# Patient Record
Sex: FEMALE | Race: OTHER | Employment: OTHER | URBAN - METROPOLITAN AREA
[De-identification: names, ages, dates, MRNs, and addresses within clinical notes are randomized per-mention and may not be internally consistent; named-entity substitution may affect disease eponyms.]

---

## 2017-05-02 ENCOUNTER — HOSPITAL ENCOUNTER (OUTPATIENT)
Dept: MRI IMAGING | Age: 62
Discharge: HOME | End: 2017-05-02
Payer: MEDICARE

## 2017-05-02 DIAGNOSIS — M96.1 POST LAMINECTOMY SYNDROME: ICD-10-CM

## 2017-05-02 PROCEDURE — A9579 GAD-BASE MR CONTRAST NOS,1ML: HCPCS | Performed by: RADIOLOGY

## 2017-05-02 PROCEDURE — 6360000004 HC RX CONTRAST MEDICATION: Performed by: RADIOLOGY

## 2017-05-02 PROCEDURE — 72158 MRI LUMBAR SPINE W/O & W/DYE: CPT

## 2017-05-02 RX ADMIN — GADOVERSETAMIDE 15 ML: 0.5 INJECTION, SOLUTION INTRAVENOUS at 11:53

## 2018-05-24 ENCOUNTER — HOSPITAL ENCOUNTER (OUTPATIENT)
Dept: WOMENS IMAGING | Age: 63
Discharge: HOME | End: 2018-05-26
Payer: MEDICARE

## 2018-05-24 ENCOUNTER — HOSPITAL ENCOUNTER (OUTPATIENT)
Dept: LAB | Age: 63
Discharge: HOME | End: 2018-05-24
Payer: MEDICARE

## 2018-05-24 ENCOUNTER — HOSPITAL ENCOUNTER (OUTPATIENT)
Dept: ULTRASOUND IMAGING | Age: 63
Discharge: HOME | End: 2018-05-26
Payer: MEDICARE

## 2018-05-24 DIAGNOSIS — R92.8 ABNORMAL MAMMOGRAM: ICD-10-CM

## 2018-05-24 DIAGNOSIS — N64.4 PAIN OF LEFT BREAST: ICD-10-CM

## 2018-05-24 DIAGNOSIS — R52 PAIN: ICD-10-CM

## 2018-05-24 LAB
ALT SERPL-CCNC: 26 U/L (ref 0–33)
ANION GAP SERPL CALCULATED.3IONS-SCNC: 18 MEQ/L (ref 7–13)
AST SERPL-CCNC: 20 U/L (ref 0–35)
BACTERIA: NORMAL /HPF
BILIRUBIN URINE: NEGATIVE
BLOOD, URINE: NEGATIVE
BUN BLDV-MCNC: 8 MG/DL (ref 8–23)
CALCIUM SERPL-MCNC: 9.5 MG/DL (ref 8.6–10.2)
CHLORIDE BLD-SCNC: 95 MEQ/L (ref 98–107)
CHOLESTEROL, TOTAL: 295 MG/DL (ref 0–199)
CLARITY: CLEAR
CO2: 25 MEQ/L (ref 22–29)
COLOR: YELLOW
CREAT SERPL-MCNC: 0.6 MG/DL (ref 0.5–0.9)
CREATININE URINE: 90.8 MG/DL
EPITHELIAL CELLS, UA: NORMAL /HPF
GFR AFRICAN AMERICAN: >60
GFR NON-AFRICAN AMERICAN: >60
GLUCOSE BLD-MCNC: 98 MG/DL (ref 74–109)
GLUCOSE URINE: NEGATIVE MG/DL
HDLC SERPL-MCNC: 26 MG/DL (ref 40–59)
KETONES, URINE: NEGATIVE MG/DL
LDL CHOLESTEROL CALCULATED: ABNORMAL MG/DL (ref 0–129)
LDL CHOLESTEROL DIRECT: 157 MG/DL (ref 0–129)
LEUKOCYTE ESTERASE, URINE: NEGATIVE
MICROALBUMIN UR-MCNC: <1.2 MG/DL
MICROALBUMIN/CREAT UR-RTO: NORMAL MG/G (ref 0–30)
NITRITE, URINE: NEGATIVE
PH UA: 5.5 (ref 5–9)
POTASSIUM SERPL-SCNC: 4 MEQ/L (ref 3.5–5.1)
PROTEIN UA: NEGATIVE MG/DL
RBC UA: NORMAL /HPF (ref 0–2)
SODIUM BLD-SCNC: 138 MEQ/L (ref 132–144)
SPECIFIC GRAVITY UA: 1.01 (ref 1–1.03)
T4 FREE: 1.94 NG/DL (ref 0.93–1.7)
TRIGL SERPL-MCNC: 587 MG/DL (ref 0–200)
TSH SERPL DL<=0.05 MIU/L-ACNC: <0.01 UIU/ML (ref 0.27–4.2)
UROBILINOGEN, URINE: 0.2 E.U./DL
VITAMIN D 25-HYDROXY: 13.7 NG/ML (ref 30–100)
WBC UA: NORMAL /HPF (ref 0–5)

## 2018-05-24 PROCEDURE — 82043 UR ALBUMIN QUANTITATIVE: CPT

## 2018-05-24 PROCEDURE — 76642 ULTRASOUND BREAST LIMITED: CPT

## 2018-05-24 PROCEDURE — 82306 VITAMIN D 25 HYDROXY: CPT

## 2018-05-24 PROCEDURE — 84460 ALANINE AMINO (ALT) (SGPT): CPT

## 2018-05-24 PROCEDURE — 82570 ASSAY OF URINE CREATININE: CPT

## 2018-05-24 PROCEDURE — 81001 URINALYSIS AUTO W/SCOPE: CPT

## 2018-05-24 PROCEDURE — 80048 BASIC METABOLIC PNL TOTAL CA: CPT

## 2018-05-24 PROCEDURE — 84439 ASSAY OF FREE THYROXINE: CPT

## 2018-05-24 PROCEDURE — 84450 TRANSFERASE (AST) (SGOT): CPT

## 2018-05-24 PROCEDURE — 84443 ASSAY THYROID STIM HORMONE: CPT

## 2018-05-24 PROCEDURE — 83721 ASSAY OF BLOOD LIPOPROTEIN: CPT

## 2018-05-24 PROCEDURE — 77066 DX MAMMO INCL CAD BI: CPT

## 2018-05-24 PROCEDURE — 80061 LIPID PANEL: CPT

## 2018-06-07 ENCOUNTER — INITIAL CONSULT (OUTPATIENT)
Dept: SURGERY | Age: 63
End: 2018-06-07
Payer: MEDICARE

## 2018-06-07 VITALS
HEIGHT: 61 IN | HEART RATE: 74 BPM | WEIGHT: 164.4 LBS | OXYGEN SATURATION: 96 % | BODY MASS INDEX: 31.04 KG/M2 | TEMPERATURE: 97.9 F | SYSTOLIC BLOOD PRESSURE: 118 MMHG | DIASTOLIC BLOOD PRESSURE: 68 MMHG

## 2018-06-07 DIAGNOSIS — R92.8 ABNORMAL MAMMOGRAM: Primary | ICD-10-CM

## 2018-06-07 PROCEDURE — G8427 DOCREV CUR MEDS BY ELIG CLIN: HCPCS | Performed by: SURGERY

## 2018-06-07 PROCEDURE — 4004F PT TOBACCO SCREEN RCVD TLK: CPT | Performed by: SURGERY

## 2018-06-07 PROCEDURE — G8417 CALC BMI ABV UP PARAM F/U: HCPCS | Performed by: SURGERY

## 2018-06-07 PROCEDURE — 3017F COLORECTAL CA SCREEN DOC REV: CPT | Performed by: SURGERY

## 2018-06-07 PROCEDURE — 99204 OFFICE O/P NEW MOD 45 MIN: CPT | Performed by: SURGERY

## 2018-06-07 ASSESSMENT — ENCOUNTER SYMPTOMS
VOMITING: 0
COLOR CHANGE: 0
TROUBLE SWALLOWING: 0
EYE PAIN: 0
BACK PAIN: 0
ABDOMINAL PAIN: 0
CHEST TIGHTNESS: 0
STRIDOR: 0
SHORTNESS OF BREATH: 0
EYE DISCHARGE: 0
ANAL BLEEDING: 0
CONSTIPATION: 0

## 2018-06-15 LAB
ANION GAP SERPL CALCULATED.3IONS-SCNC: 12 MMOL/L (ref 10–20)
BICARBONATE: 29 MMOL/L (ref 21–32)
BUN / CREAT RATIO: 12 (ref 5–25)
CALCIUM SERPL-MCNC: 9.4 MG/DL (ref 8.6–10.3)
CHLORIDE BLD-SCNC: 102 MMOL/L (ref 98–107)
CREAT SERPL-MCNC: 0.75 MG/DL (ref 0.5–1.05)
ERYTHROCYTE [DISTWIDTH] IN BLOOD BY AUTOMATED COUNT: 12.7 % (ref 12–15.4)
ERYTHROCYTE [DISTWIDTH] IN BLOOD BY AUTOMATED COUNT: 41.4 FL (ref 39.3–48.6)
GFR CALCULATED: >60
GLUCOSE: 114 MG/DL (ref 70–100)
HCT VFR BLD CALC: 43.7 % (ref 36.5–46.6)
HEMOGLOBIN: 14.7 G/DL (ref 11.8–15.3)
INR BLD: 0.98 (ref 0.9–1.1)
MCH RBC QN AUTO: 29.9 PG (ref 27.5–33)
MCHC RBC AUTO-ENTMCNC: 33.6 G/DL (ref 30.1–35)
MCV RBC AUTO: 89 FL (ref 85.4–100)
NUCLEATED RBCS: 0 /100{WBCS}
PARTIAL THROMBOPLASTIN TIME: 31.1 SEC (ref 25–36)
PLATELET # BLD: 251 10*3/UL (ref 155–404)
PMV BLD AUTO: 9.9 FL (ref 9.9–12.1)
POTASSIUM SERPL-SCNC: 3.6 MMOL/L (ref 3.5–5.1)
PROTHROMBIN TIME: 10.9 SEC (ref 9.8–12.7)
RBC: 4.91 10*6/UL (ref 3.85–5.1)
RBCS COUNTED: 0 10*3/UL
SODIUM BLD-SCNC: 139 MMOL/L (ref 136–145)
UREA NITROGEN: 9 MG/DL (ref 6–23)
WBC: 5.5 10*3/UL (ref 4.4–9.9)

## 2018-06-21 LAB — PATHOLOGY REPORT: NORMAL

## 2018-07-03 ENCOUNTER — OFFICE VISIT (OUTPATIENT)
Dept: SURGERY | Age: 63
End: 2018-07-03

## 2018-07-03 VITALS
HEART RATE: 81 BPM | TEMPERATURE: 98 F | HEIGHT: 61 IN | SYSTOLIC BLOOD PRESSURE: 136 MMHG | DIASTOLIC BLOOD PRESSURE: 88 MMHG | OXYGEN SATURATION: 96 % | BODY MASS INDEX: 31.36 KG/M2 | WEIGHT: 166.1 LBS

## 2018-07-03 DIAGNOSIS — R92.8 ABNORMAL MAMMOGRAM: Primary | ICD-10-CM

## 2018-07-03 PROCEDURE — 99024 POSTOP FOLLOW-UP VISIT: CPT | Performed by: SURGERY

## 2018-07-03 NOTE — PROGRESS NOTES
S/p needle localization biopsy    PATH  Benign    PE/  Wound good    A/ benign biopsy    P/ new baseline MMG in 6 months

## 2019-10-04 ENCOUNTER — OFFICE VISIT (OUTPATIENT)
Dept: ENDOCRINOLOGY | Age: 64
End: 2019-10-04
Payer: MEDICARE

## 2019-10-04 VITALS
WEIGHT: 173 LBS | BODY MASS INDEX: 32.66 KG/M2 | SYSTOLIC BLOOD PRESSURE: 105 MMHG | HEIGHT: 61 IN | DIASTOLIC BLOOD PRESSURE: 69 MMHG | HEART RATE: 69 BPM

## 2019-10-04 DIAGNOSIS — R73.9 HYPERGLYCEMIA: ICD-10-CM

## 2019-10-04 DIAGNOSIS — R53.83 FATIGUE, UNSPECIFIED TYPE: Primary | ICD-10-CM

## 2019-10-04 DIAGNOSIS — E27.1 ADDISON'S DISEASE (HCC): ICD-10-CM

## 2019-10-04 DIAGNOSIS — E06.3 HYPOTHYROIDISM DUE TO HASHIMOTO'S THYROIDITIS: ICD-10-CM

## 2019-10-04 DIAGNOSIS — R53.83 FATIGUE, UNSPECIFIED TYPE: ICD-10-CM

## 2019-10-04 DIAGNOSIS — E03.8 HYPOTHYROIDISM DUE TO HASHIMOTO'S THYROIDITIS: ICD-10-CM

## 2019-10-04 LAB
CORTISOL TOTAL: 1.7 UG/DL
FOLATE: 7 NG/ML (ref 7.3–26.1)
HBA1C MFR BLD: 6.2 % (ref 4.8–5.9)
T4 FREE: 1.6 NG/DL (ref 0.84–1.68)
TSH SERPL DL<=0.05 MIU/L-ACNC: 0.82 UIU/ML (ref 0.44–3.86)
VITAMIN B-12: 308 PG/ML (ref 232–1245)

## 2019-10-04 PROCEDURE — G8427 DOCREV CUR MEDS BY ELIG CLIN: HCPCS | Performed by: INTERNAL MEDICINE

## 2019-10-04 PROCEDURE — 4004F PT TOBACCO SCREEN RCVD TLK: CPT | Performed by: INTERNAL MEDICINE

## 2019-10-04 PROCEDURE — 3017F COLORECTAL CA SCREEN DOC REV: CPT | Performed by: INTERNAL MEDICINE

## 2019-10-04 PROCEDURE — 99203 OFFICE O/P NEW LOW 30 MIN: CPT | Performed by: INTERNAL MEDICINE

## 2019-10-04 PROCEDURE — G8484 FLU IMMUNIZE NO ADMIN: HCPCS | Performed by: INTERNAL MEDICINE

## 2019-10-04 PROCEDURE — G8417 CALC BMI ABV UP PARAM F/U: HCPCS | Performed by: INTERNAL MEDICINE

## 2019-10-04 RX ORDER — LEVOTHYROXINE SODIUM 175 UG/1
175 TABLET ORAL DAILY
Qty: 30 TABLET | Refills: 3
Start: 2019-10-04

## 2019-10-06 PROBLEM — M79.7 FIBROMYALGIA: Status: ACTIVE | Noted: 2019-10-06

## 2019-10-06 PROBLEM — E27.1 ADDISON'S DISEASE (HCC): Status: ACTIVE | Noted: 2019-10-06

## 2019-10-06 PROBLEM — F41.9 ANXIETY: Status: ACTIVE | Noted: 2019-10-06

## 2019-10-06 PROBLEM — E03.9 HYPOTHYROIDISM: Status: ACTIVE | Noted: 2019-10-06

## 2019-10-06 RX ORDER — COSYNTROPIN 0.25 MG/ML
250 INJECTION, POWDER, FOR SOLUTION INTRAMUSCULAR; INTRAVENOUS ONCE
Status: CANCELLED | OUTPATIENT
Start: 2019-10-06

## 2019-10-06 ASSESSMENT — ENCOUNTER SYMPTOMS: SWOLLEN GLANDS: 0

## 2019-10-11 ENCOUNTER — TELEPHONE (OUTPATIENT)
Dept: ENDOCRINOLOGY | Age: 64
End: 2019-10-11

## 2019-10-21 ENCOUNTER — HOSPITAL ENCOUNTER (OUTPATIENT)
Dept: INFUSION THERAPY | Age: 64
Setting detail: INFUSION SERIES
Discharge: HOME | End: 2019-10-21
Payer: MEDICARE

## 2019-10-21 VITALS — HEART RATE: 80 BPM | TEMPERATURE: 98.5 F | SYSTOLIC BLOOD PRESSURE: 120 MMHG | DIASTOLIC BLOOD PRESSURE: 57 MMHG

## 2019-10-21 DIAGNOSIS — E27.1 ADDISON'S DISEASE (HCC): Primary | ICD-10-CM

## 2019-10-21 LAB
CORTISOL 30 MIN: 10.7 UG/DL
CORTISOL 60 MIN: 12.5 UG/DL
CORTISOL BASE: 2.1 UG/DL

## 2019-10-21 PROCEDURE — 6360000002 HC RX W HCPCS: Performed by: INTERNAL MEDICINE

## 2019-10-21 PROCEDURE — 36415 COLL VENOUS BLD VENIPUNCTURE: CPT

## 2019-10-21 PROCEDURE — 96374 THER/PROPH/DIAG INJ IV PUSH: CPT

## 2019-10-21 PROCEDURE — 80400 ACTH STIMULATION PANEL: CPT

## 2019-10-21 RX ORDER — COSYNTROPIN 0.25 MG/ML
250 INJECTION, POWDER, FOR SOLUTION INTRAMUSCULAR; INTRAVENOUS ONCE
Status: CANCELLED | OUTPATIENT
Start: 2019-10-21

## 2019-10-21 RX ORDER — COSYNTROPIN 0.25 MG/ML
250 INJECTION, POWDER, FOR SOLUTION INTRAMUSCULAR; INTRAVENOUS ONCE
Status: COMPLETED | OUTPATIENT
Start: 2019-10-21 | End: 2019-10-21

## 2019-10-21 RX ADMIN — COSYNTROPIN 250 MCG: 0.25 INJECTION, POWDER, LYOPHILIZED, FOR SOLUTION INTRAMUSCULAR; INTRAVENOUS at 13:21

## 2019-10-21 NOTE — FLOWSHEET NOTE
Last blood draw obtained and sent. Iv removed. AVS printed and given to patient. Ambulated off of the unit. All equipment used in the care for this patient has been cleaned.

## 2019-10-21 NOTE — FLOWSHEET NOTE
Thirty minute cortisol level obtained and sent to lab. Next draw due at 1423. Patient resting in chair with no effects.

## 2019-10-21 NOTE — FLOWSHEET NOTE
Baseline lab obtained and sent to lab. Medication given as ordered. Patient tolerating well. Call light within reach.

## 2020-07-25 ENCOUNTER — APPOINTMENT (OUTPATIENT)
Dept: CT IMAGING | Age: 65
End: 2020-07-25
Payer: MEDICARE

## 2020-07-25 ENCOUNTER — HOSPITAL ENCOUNTER (OUTPATIENT)
Age: 65
Setting detail: OBSERVATION
Discharge: HOME | End: 2020-07-25
Attending: EMERGENCY MEDICINE | Admitting: INTERNAL MEDICINE
Payer: MEDICARE

## 2020-07-25 VITALS
DIASTOLIC BLOOD PRESSURE: 71 MMHG | BODY MASS INDEX: 36.91 KG/M2 | HEART RATE: 82 BPM | OXYGEN SATURATION: 100 % | WEIGHT: 188 LBS | SYSTOLIC BLOOD PRESSURE: 131 MMHG | TEMPERATURE: 98.1 F | HEIGHT: 60 IN | RESPIRATION RATE: 20 BRPM

## 2020-07-25 PROBLEM — R11.2 NAUSEA AND VOMITING: Status: ACTIVE | Noted: 2020-07-25

## 2020-07-25 LAB
ALBUMIN SERPL-MCNC: 4.6 G/DL (ref 3.5–4.6)
ALP BLD-CCNC: 93 U/L (ref 40–130)
ALT SERPL-CCNC: 37 U/L (ref 0–33)
ANION GAP SERPL CALCULATED.3IONS-SCNC: 14 MEQ/L (ref 9–15)
AST SERPL-CCNC: 27 U/L (ref 0–35)
BASOPHILS ABSOLUTE: 0.2 K/UL (ref 0–0.2)
BASOPHILS RELATIVE PERCENT: 1.2 %
BILIRUB SERPL-MCNC: 0.3 MG/DL (ref 0.2–0.7)
BUN BLDV-MCNC: 8 MG/DL (ref 8–23)
CALCIUM SERPL-MCNC: 9.7 MG/DL (ref 8.5–9.9)
CHLORIDE BLD-SCNC: 92 MEQ/L (ref 95–107)
CO2: 28 MEQ/L (ref 20–31)
CREAT SERPL-MCNC: 0.8 MG/DL (ref 0.5–0.9)
EOSINOPHILS ABSOLUTE: 0.2 K/UL (ref 0–0.7)
EOSINOPHILS RELATIVE PERCENT: 1 %
GFR AFRICAN AMERICAN: >60
GFR NON-AFRICAN AMERICAN: >60
GLOBULIN: 3.1 G/DL (ref 2.3–3.5)
GLUCOSE BLD-MCNC: 162 MG/DL (ref 70–99)
HCT VFR BLD CALC: 51 % (ref 37–47)
HEMOGLOBIN: 17.3 G/DL (ref 12–16)
LIPASE: 11 U/L (ref 12–95)
LYMPHOCYTES ABSOLUTE: 3.9 K/UL (ref 1–4.8)
LYMPHOCYTES RELATIVE PERCENT: 24.6 %
MCH RBC QN AUTO: 30.2 PG (ref 27–31.3)
MCHC RBC AUTO-ENTMCNC: 33.9 % (ref 33–37)
MCV RBC AUTO: 89.1 FL (ref 82–100)
MONOCYTES ABSOLUTE: 0.8 K/UL (ref 0.2–0.8)
MONOCYTES RELATIVE PERCENT: 5.3 %
NEUTROPHILS ABSOLUTE: 10.8 K/UL (ref 1.4–6.5)
NEUTROPHILS RELATIVE PERCENT: 67.9 %
PDW BLD-RTO: 15 % (ref 11.5–14.5)
PLATELET # BLD: 345 K/UL (ref 130–400)
POTASSIUM SERPL-SCNC: 3.7 MEQ/L (ref 3.4–4.9)
RBC # BLD: 5.72 M/UL (ref 4.2–5.4)
SODIUM BLD-SCNC: 134 MEQ/L (ref 135–144)
TOTAL PROTEIN: 7.7 G/DL (ref 6.3–8)
WBC # BLD: 15.8 K/UL (ref 4.8–10.8)

## 2020-07-25 PROCEDURE — 6360000002 HC RX W HCPCS: Performed by: INTERNAL MEDICINE

## 2020-07-25 PROCEDURE — 96376 TX/PRO/DX INJ SAME DRUG ADON: CPT

## 2020-07-25 PROCEDURE — 99285 EMERGENCY DEPT VISIT HI MDM: CPT

## 2020-07-25 PROCEDURE — 2500000003 HC RX 250 WO HCPCS: Performed by: EMERGENCY MEDICINE

## 2020-07-25 PROCEDURE — 80053 COMPREHEN METABOLIC PANEL: CPT

## 2020-07-25 PROCEDURE — 83690 ASSAY OF LIPASE: CPT

## 2020-07-25 PROCEDURE — 96372 THER/PROPH/DIAG INJ SC/IM: CPT

## 2020-07-25 PROCEDURE — 6360000002 HC RX W HCPCS: Performed by: EMERGENCY MEDICINE

## 2020-07-25 PROCEDURE — 74176 CT ABD & PELVIS W/O CONTRAST: CPT

## 2020-07-25 PROCEDURE — 2580000003 HC RX 258: Performed by: EMERGENCY MEDICINE

## 2020-07-25 PROCEDURE — 96374 THER/PROPH/DIAG INJ IV PUSH: CPT

## 2020-07-25 PROCEDURE — 36415 COLL VENOUS BLD VENIPUNCTURE: CPT

## 2020-07-25 PROCEDURE — G0378 HOSPITAL OBSERVATION PER HR: HCPCS

## 2020-07-25 PROCEDURE — 85025 COMPLETE CBC W/AUTO DIFF WBC: CPT

## 2020-07-25 PROCEDURE — 96375 TX/PRO/DX INJ NEW DRUG ADDON: CPT

## 2020-07-25 RX ORDER — ASPIRIN 81 MG/1
81 TABLET, CHEWABLE ORAL DAILY
Status: CANCELLED | OUTPATIENT
Start: 2020-07-25

## 2020-07-25 RX ORDER — QUETIAPINE FUMARATE 25 MG/1
25 TABLET, FILM COATED ORAL EVERY EVENING
Status: CANCELLED | OUTPATIENT
Start: 2020-07-25

## 2020-07-25 RX ORDER — TRAZODONE HYDROCHLORIDE 50 MG/1
50 TABLET ORAL NIGHTLY
Status: CANCELLED | OUTPATIENT
Start: 2020-07-25

## 2020-07-25 RX ORDER — METHYLPREDNISOLONE SODIUM SUCCINATE 125 MG/2ML
125 INJECTION, POWDER, LYOPHILIZED, FOR SOLUTION INTRAMUSCULAR; INTRAVENOUS ONCE
Status: COMPLETED | OUTPATIENT
Start: 2020-07-25 | End: 2020-07-25

## 2020-07-25 RX ORDER — ONDANSETRON 2 MG/ML
4 INJECTION INTRAMUSCULAR; INTRAVENOUS ONCE
Status: COMPLETED | OUTPATIENT
Start: 2020-07-25 | End: 2020-07-25

## 2020-07-25 RX ORDER — KETOROLAC TROMETHAMINE 30 MG/ML
30 INJECTION, SOLUTION INTRAMUSCULAR; INTRAVENOUS ONCE
Status: DISCONTINUED | OUTPATIENT
Start: 2020-07-25 | End: 2020-07-25

## 2020-07-25 RX ORDER — LORAZEPAM 2 MG/ML
2 INJECTION INTRAMUSCULAR ONCE
Status: COMPLETED | OUTPATIENT
Start: 2020-07-25 | End: 2020-07-25

## 2020-07-25 RX ORDER — ONDANSETRON 2 MG/ML
4 INJECTION INTRAMUSCULAR; INTRAVENOUS EVERY 6 HOURS PRN
Status: DISCONTINUED | OUTPATIENT
Start: 2020-07-25 | End: 2020-07-25 | Stop reason: HOSPADM

## 2020-07-25 RX ORDER — PROMETHAZINE HYDROCHLORIDE 12.5 MG/1
12.5 TABLET ORAL EVERY 6 HOURS PRN
Status: DISCONTINUED | OUTPATIENT
Start: 2020-07-25 | End: 2020-07-25 | Stop reason: HOSPADM

## 2020-07-25 RX ORDER — KETAMINE HYDROCHLORIDE 50 MG/ML
200 INJECTION, SOLUTION, CONCENTRATE INTRAMUSCULAR; INTRAVENOUS ONCE
Status: COMPLETED | OUTPATIENT
Start: 2020-07-25 | End: 2020-07-25

## 2020-07-25 RX ORDER — ASPIRIN 81 MG/1
81 TABLET, CHEWABLE ORAL DAILY
COMMUNITY

## 2020-07-25 RX ORDER — AMLODIPINE BESYLATE 2.5 MG/1
2.5 TABLET ORAL DAILY
COMMUNITY
Start: 2019-05-30

## 2020-07-25 RX ORDER — POLYETHYLENE GLYCOL 3350 17 G/17G
17 POWDER, FOR SOLUTION ORAL DAILY PRN
Status: DISCONTINUED | OUTPATIENT
Start: 2020-07-25 | End: 2020-07-25 | Stop reason: HOSPADM

## 2020-07-25 RX ORDER — QUETIAPINE FUMARATE 25 MG/1
25 TABLET, FILM COATED ORAL EVERY EVENING
COMMUNITY

## 2020-07-25 RX ORDER — 0.9 % SODIUM CHLORIDE 0.9 %
1000 INTRAVENOUS SOLUTION INTRAVENOUS ONCE
Status: COMPLETED | OUTPATIENT
Start: 2020-07-25 | End: 2020-07-25

## 2020-07-25 RX ORDER — ACETAMINOPHEN 325 MG/1
650 TABLET ORAL EVERY 6 HOURS PRN
Status: DISCONTINUED | OUTPATIENT
Start: 2020-07-25 | End: 2020-07-25 | Stop reason: HOSPADM

## 2020-07-25 RX ORDER — TRAZODONE HYDROCHLORIDE 50 MG/1
50 TABLET ORAL NIGHTLY
COMMUNITY

## 2020-07-25 RX ADMIN — HYDROMORPHONE HYDROCHLORIDE 1 MG: 1 INJECTION, SOLUTION INTRAMUSCULAR; INTRAVENOUS; SUBCUTANEOUS at 05:41

## 2020-07-25 RX ADMIN — LORAZEPAM 2 MG: 2 INJECTION INTRAMUSCULAR; INTRAVENOUS at 05:41

## 2020-07-25 RX ADMIN — ONDANSETRON 4 MG: 2 INJECTION INTRAMUSCULAR; INTRAVENOUS at 05:41

## 2020-07-25 RX ADMIN — KETAMINE HYDROCHLORIDE 200 MG: 50 INJECTION, SOLUTION INTRAMUSCULAR; INTRAVENOUS at 07:09

## 2020-07-25 RX ADMIN — ONDANSETRON 4 MG: 2 INJECTION INTRAMUSCULAR; INTRAVENOUS at 07:54

## 2020-07-25 RX ADMIN — ENOXAPARIN SODIUM 40 MG: 40 INJECTION SUBCUTANEOUS at 11:17

## 2020-07-25 RX ADMIN — METHYLNALTREXONE BROMIDE 12 MG: 12 INJECTION, SOLUTION SUBCUTANEOUS at 11:17

## 2020-07-25 RX ADMIN — METHYLPREDNISOLONE SODIUM SUCCINATE 125 MG: 125 INJECTION, POWDER, FOR SOLUTION INTRAMUSCULAR; INTRAVENOUS at 05:41

## 2020-07-25 RX ADMIN — SODIUM CHLORIDE 1000 ML: 9 INJECTION, SOLUTION INTRAVENOUS at 05:42

## 2020-07-25 ASSESSMENT — ENCOUNTER SYMPTOMS
WHEEZING: 0
PHOTOPHOBIA: 0
SORE THROAT: 0
CHEST TIGHTNESS: 0
EYE DISCHARGE: 0
NAUSEA: 1
SHORTNESS OF BREATH: 0
ABDOMINAL PAIN: 1
ABDOMINAL DISTENTION: 0
COUGH: 0
VOMITING: 1

## 2020-07-25 ASSESSMENT — PAIN DESCRIPTION - ORIENTATION
ORIENTATION: ANTERIOR;UPPER
ORIENTATION: ANTERIOR;UPPER

## 2020-07-25 ASSESSMENT — PAIN SCALES - GENERAL
PAINLEVEL_OUTOF10: 5
PAINLEVEL_OUTOF10: 10

## 2020-07-25 ASSESSMENT — PAIN DESCRIPTION - PAIN TYPE
TYPE: ACUTE PAIN

## 2020-07-25 ASSESSMENT — PAIN DESCRIPTION - DESCRIPTORS
DESCRIPTORS: CONSTANT
DESCRIPTORS: CONSTANT;STABBING
DESCRIPTORS: CONSTANT;STABBING;SHARP

## 2020-07-25 ASSESSMENT — PAIN DESCRIPTION - FREQUENCY
FREQUENCY: CONTINUOUS
FREQUENCY: CONTINUOUS

## 2020-07-25 ASSESSMENT — PAIN DESCRIPTION - LOCATION
LOCATION: ABDOMEN

## 2020-07-25 NOTE — PROGRESS NOTES
Patient requesting Discharge, Dr. Roger Whitfield aware, discharge order placed. Pt states she has had multiple bowel movements, no nausea or vomiting. Discharge instructions given to patient, waiting on  to bring clothes to patient. IV removed upon transport out of room. Will continue to monitor.

## 2020-07-25 NOTE — ED PROVIDER NOTES
[facility] ED  eMERGENCY dEPARTMENT eNCOUnter      Pt Name: Naina Haji  MRN: [id number]  [place] 1955  Date of evaluation: 7/25/2020  Provider: Barb Lieberman MD    [address] [place] [address]       Chief Complaint   Patient presents with    Abdominal Pain     x1 day         HISTORY OF PRESENT ILLNESS   (Location/Symptom, Timing/Onset,Context/Setting, Quality, Duration, Modifying Factors, Severity)  Note limiting factors. Naina Haji is a 72 y.o. female who presents to the emergency department for evaluation of abdominal pain going on for the past 24 hours. Patient describes abdominal and epigastric pain that is nonradiating. Pain level is severe. She is developed nausea and then large amounts of vomiting upon arrival here. She is had no prior bowel obstruction. She has had prior surgery including cholecystectomy and hysterectomy. She has chronic pain syndromes with fibromyalgia. Today she denies fever, chest pain or shortness of breath. HPI    NursingNotes were reviewed. REVIEW OF SYSTEMS    (2-9 systems for level 4, 10 or more for level 5)     Review of Systems   Constitutional: Negative for chills and diaphoresis. HENT: Negative for congestion, ear pain, mouth sores and sore throat. Eyes: Negative for photophobia and discharge. Respiratory: Negative for cough, chest tightness, shortness of breath and wheezing. Cardiovascular: Negative for chest pain and palpitations. Gastrointestinal: Positive for abdominal pain, nausea and vomiting. Negative for abdominal distention. Endocrine: Negative for cold intolerance. Genitourinary: Negative for difficulty urinating. Musculoskeletal: Negative for arthralgias. Skin: Negative for pallor and rash. Allergic/Immunologic: Negative for immunocompromised state. Neurological: Negative for dizziness and syncope. Hematological: Negative for adenopathy. Psychiatric/Behavioral: Negative for agitation and hallucinations.    All other systems reviewed and are negative. Except as noted above the remainder of the review of systems was reviewed and negative. PAST MEDICAL HISTORY     Past Medical History:   Diagnosis Date    [place] disease ([facility] [address]         Anxiety     Diverticulitis     Fibromyalgia     Hyperlipidemia     Hypothyroidism     Osteopenia     Restless legs syndrome          SURGICALHISTORY       Past Surgical History:   Procedure Laterality Date    BLADDER SUSPENSION      CARPAL TUNNEL RELEASE Bilateral      SECTION      FINGER TRIGGER RELEASE Right     HYSTERECTOMY       complete     MECKEL DIVERTICULUM EXCISION      SPINE SURGERY      dr Tabby Chin       Previous Medications    ALPRAZOLAM (XANAX) 1 MG TABLET    Take 1 mg by mouth 3 times daily as needed for Anxiety. AMLODIPINE (NORVASC) 2.5 MG TABLET    Take 2.5 mg by mouth daily    FEBUXOSTAT (ULORIC) 40 MG TABS TABLET    Take 40 mg by mouth daily. GABAPENTIN (NEURONTIN) 300 MG CAPSULE    Take 300 mg by mouth 3 times daily. LEVOTHYROXINE (SYNTHROID) 175 MCG TABLET    Take 1 tablet by mouth Daily    METHADONE (DOLOPHINE) 10 MG TABLET    Take 10 mg by mouth every 6 hours as needed for Pain. NICOTINE (NICODERM CQ) 21 MG/24HR    Place 1 patch onto the skin every 24 hours. OXYCODONE-ACETAMINOPHEN (PERCOCET)  MG PER TABLET    Take 1 tablet by mouth every 8 hours as needed for Pain. TRAZODONE (DESYREL) 50 MG TABLET    Take 50 mg by mouth nightly       ALLERGIES     Morphine    FAMILY HISTORY     History reviewed. No pertinent family history.        SOCIAL HISTORY       Social History     Socioeconomic History    Marital status:      Spouse name: None    Number of children: None    Years of education: None    Highest education level: None   Occupational History    None   Social Needs    Financial resource strain: None    Food insecurity     Worry: None     Inability: None    Transportation needs     Medical: None     Non-medical: None   Tobacco Use    Smoking status: Former Smoker     Years: 44.00     Types: Cigarettes     Last attempt to quit: 2014     Years since quittin.2    Smokeless tobacco: Never Used   Substance and Sexual Activity    Alcohol use: No    Drug use: No    Sexual activity: Not Currently   Lifestyle    Physical activity     Days per week: None     Minutes per session: None    Stress: None   Relationships    Social connections     Talks on phone: None     Gets together: None     Attends Congregational service: None     Active member of club or organization: None     Attends meetings of clubs or organizations: None     Relationship status: None    Intimate partner violence     Fear of current or ex partner: None     Emotionally abused: None     Physically abused: None     Forced sexual activity: None   Other Topics Concern    None   Social History Narrative    None       SCREENINGS      @FLOW([id number])@      PHYSICAL EXAM    (up to 7 for level 4, 8 or more for level 5)     ED Triage Vitals   BP Temp Temp src Pulse Resp SpO2 Height Weight   -- -- -- -- -- -- -- --       Physical Exam  Vitals signs and nursing note reviewed. Constitutional:       General: She is in acute distress. Appearance: She is well-developed. She is ill-appearing and diaphoretic. HENT:      Head: Normocephalic. Right Ear: Tympanic membrane normal.      Left Ear: Tympanic membrane normal.      Nose: Nose normal.      Mouth/Throat:      Mouth: Mucous membranes are moist.   Eyes:      Conjunctiva/sclera: Conjunctivae normal.      Pupils: Pupils are equal, round, and reactive to light. Neck:      Musculoskeletal: Normal range of motion and neck supple. Cardiovascular:      Rate and Rhythm: Normal rate and regular rhythm. Heart sounds: Normal heart sounds. Pulmonary:      Effort: Pulmonary effort is normal.      Breath sounds: Normal breath sounds.  No wheezing. Abdominal:      General: Bowel sounds are decreased. There is distension. Palpations: Abdomen is soft. There is no fluid wave. Tenderness: There is abdominal tenderness in the epigastric area. There is no guarding or rebound. Hernia: No hernia is present. Musculoskeletal: Normal range of motion. Skin:     General: Skin is warm. Capillary Refill: Capillary refill takes less than 2 seconds. Coloration: Skin is not jaundiced. Neurological:      General: No focal deficit present. Mental Status: She is alert and oriented to person, place, and time. Psychiatric:         Mood and Affect: Mood normal.         DIAGNOSTIC RESULTS     EKG: All EKG's are interpreted by the Emergency Department Physician who either signs or Co-signsthis chart in the absence of a cardiologist.        RADIOLOGY:   Non-plain filmimages such as CT, Ultrasound and MRI are read by the radiologist. Plain radiographic images are visualized and preliminarily interpreted by the emergency physician with the below findings:    CT scan reviewed. There is extensive fluid-filled small bowel with numerous air-fluid levels. There is no obvious obstruction as noted by radiologist.  Please see radiologist report for detail.       Interpretation per the Radiologist below, if available at the time ofthis note:    CT ABDOMEN PELVIS WO CONTRAST Additional Contrast? None    (Results Pending)         ED BEDSIDE ULTRASOUND:   Performed by ED Physician - none    LABS:  Labs Reviewed   COMPREHENSIVE METABOLIC PANEL - Abnormal; Notable for the following components:       Result Value    Sodium 134 (*)     Chloride 92 (*)     Glucose 162 (*)     ALT 37 (*)     All other components within normal limits   CBC WITH AUTO DIFFERENTIAL - Abnormal; Notable for the following components:    WBC 15.8 (*)     RBC 5.72 (*)     Hemoglobin 17.3 (*)     Hematocrit 51.0 (*)     RDW 15.0 (*)     Neutrophils Absolute 10.8 (*)     All other components within normal limits   LIPASE - Abnormal; Notable for the following components:    Lipase 11 (*)     All other components within normal limits   URINE RT REFLEX TO CULTURE       All other labs were within normal range or not returned as of this dictation. EMERGENCY DEPARTMENT COURSE and DIFFERENTIAL DIAGNOSIS/MDM:   Vitals:    Vitals:    07/25/20 0516 07/25/20 0522 07/25/20 0708   BP:  (!) 175/100 (!) 185/84   Pulse: 99 89 86   Resp: 20  18   Temp: 98.5 °F (36.9 °C)     TempSrc: Oral     SpO2: 96%  98%   Weight: 188 lb (85.3 kg)     Height: 5' (1.524 m)            MDM     Patient with ongoing significant symptoms. Patient's abdomen remains tender and firm distended. While CT does not demonstrate evidence of obstruction at this time, clinical findings are most consistent with that. Intractable nature patient symptomatology is inability to stop vomiting, will admit to medical service for further evaluation and management. CRITICAL CARE TIME   Total Critical Care time was -- minutes, excluding separately reportableprocedures. There was a high probability of clinicallysignificant/life threatening deterioration in the patient's condition which required my urgent intervention. CONSULTS:  None    PROCEDURES:  Unless otherwise noted below, none     Procedures    FINAL IMPRESSION      1. Intractable nausea and vomiting    2. Generalized abdominal pain          DISPOSITION/PLAN   DISPOSITION        PATIENT REFERRED TO:  No follow-up provider specified.     DISCHARGE MEDICATIONS:  New Prescriptions    No medications on file          (Please note that portions of this note were completed with a voice recognition program.  Efforts were made to edit the dictations but occasionally words are mis-transcribed.)    Cathie Colorado MD (electronically signed)  Attending Emergency Physician          Cathie Colorado MD  07/25/20 3731

## 2020-07-25 NOTE — PROGRESS NOTES
Patient assessment and vitals complete and stable. Patient denies nausea or vomiting this shift. Falls precautions in place. Pt has purse and wallet in room, does not know how much money is in her purse, and doesn't want security to secure it. Patient provided with much education. Will continue to monitor.

## 2020-07-25 NOTE — ED TRIAGE NOTES
Pt reports to the ER from home via [facility] [facility] with c/o \"severe and sharp\" epigastric pain x 1 day. Pt reports associated nausea but denied vomiting at time of triage start. However, pt leaned over bed and vomited quite a large amount of yellowish emesis onto floor.  Pt slightly uncooperative during triage process and when assisted into gown by other RN called her a vulgar term in [address] [facility]

## 2020-07-25 NOTE — DISCHARGE SUMMARY
gallop  Gastrointestinal: soft, non-tender; bowel sounds normal; no masses,  no organomegaly  Genitourinary: Deferred  Musculoskeletal:extremities normal, atraumatic, no cyanosis or edema  Neurologic: Mental status AAOx3 No facial asymmetry or droop. Normal muscle strength b/l. Psychiatric: Appropriate mood and affect. Good insight and judgement  Hematologic: No obvious bruising or bleeding    Significant Diagnostic Studies:   EXAMINATION: Unenhanced CT abdomen/pelvis         CLINICAL HISTORY: 58-year-old with severe abdominal pain         TECHNIQUE: Unenhanced serial axial images were obtained from the lung bases through the pelvis. Sagittal coronal reformats were also obtained. Compare is made with a previous CT study from 5/10/2008. All CT scans at this facility use dose modulation,    iterative reconstruction, and/or weight based dosing when appropriate to reduce radiation dose to as low as reasonably achievable.         FINDINGS: Images through the lung bases demonstrate normal pulmonary parenchyma. There are no pleural effusions.         Lack of IV contrast does compromise solid organ evaluation. Given this limitation, liver, spleen, and adrenal glands demonstrate normal CT attenuation. There is fatty replacement of the a pancreas. Gallbladder is not visualized suggesting    cholecystectomy. No biliary ductal dilatations. Unenhanced kidneys are within normal limits. No dilated renal collecting systems. Aorta is normal in caliber with atherosclerotic calcifications. No abdominal lymphadenopathy, free fluid or free air.         There is extensive fluid-filled small bowel with numerous air-fluid levels. No definite bowel wall thickening and small bowel loops are only mildly dilated. Differential considerations do include enteritis/ileitis. Early bowel obstruction not excluded on     the basis study.         There are surgical clips in the a pelvis as well as postoperative changes in the lumbar spine.  Uterus is plans with consultants, reconciling medications, discussing plan answering questions with patient.      Irvin Garcia  7/25/2020, 5:42 PM

## 2020-07-25 NOTE — H&P
supple, symmetrical, trachea midline and thyroid not enlarged, symmetric, no tenderness/mass/nodules  Respiratory: clear to auscultation bilaterally  Cardiovascular: regular rate and rhythm, S1, S2 normal, no murmur, click, rub or gallop  Gastrointestinal: soft, non-tender; bowel sounds normal; no masses,  no organomegaly  Genitourinary: Deferred  Musculoskeletal:extremities normal, atraumatic, no cyanosis or edema  Neurologic: Mental status AAOx3 No facial asymmetry or droop. Normal muscle strength b/l. Psychiatric: Appropriate mood and affect. Good insight and judgement  Hematologic: No obvious bruising or bleeding    Recent Labs     07/25/20 0515   WBC 15.8*   HGB 17.3*        Recent Labs     07/25/20 0515   *   K 3.7   CL 92*   CO2 28   BUN 8   CREATININE 0.80   GLUCOSE 162*   AST 27   ALT 37*   BILITOT 0.3   ALKPHOS 93     ABGs:   Lab Results   Component Value Date    PHART 7.408 05/05/2014    PO2ART 71 05/05/2014    ZXR5VZC 44 05/05/2014     INR: No results for input(s): INR in the last 72 hours. URINALYSIS:No results for input(s): NITRITE, COLORU, PHUR, LABCAST, WBCUA, RBCUA, MUCUS, TRICHOMONAS, YEAST, BACTERIA, CLARITYU, SPECGRAV, LEUKOCYTESUR, UROBILINOGEN, BILIRUBINUR, BLOODU, GLUCOSEU, AMORPHOUS in the last 72 hours. Invalid input(s): Berl Shorts  -----------------------------------------------------------------   Ct Abdomen Pelvis Wo Contrast Additional Contrast? None    Result Date: 7/25/2020  EXAMINATION: Unenhanced CT abdomen/pelvis CLINICAL HISTORY: 54-year-old with severe abdominal pain TECHNIQUE: Unenhanced serial axial images were obtained from the lung bases through the pelvis. Sagittal coronal reformats were also obtained. Compare is made with a previous CT study from 5/10/2008. All CT scans at this facility use dose modulation, iterative reconstruction, and/or weight based dosing when appropriate to reduce radiation dose to as low as reasonably achievable.  FINDINGS: Images through the lung bases demonstrate normal pulmonary parenchyma. There are no pleural effusions. Lack of IV contrast does compromise solid organ evaluation. Given this limitation, liver, spleen, and adrenal glands demonstrate normal CT attenuation. There is fatty replacement of the a pancreas. Gallbladder is not visualized suggesting cholecystectomy. No biliary ductal dilatations. Unenhanced kidneys are within normal limits. No dilated renal collecting systems. Aorta is normal in caliber with atherosclerotic calcifications. No abdominal lymphadenopathy, free fluid or free air. There is extensive fluid-filled small bowel with numerous air-fluid levels. No definite bowel wall thickening and small bowel loops are only mildly dilated. Differential considerations do include enteritis/ileitis. Early bowel obstruction not excluded on  the basis study. There are surgical clips in the a pelvis as well as postoperative changes in the lumbar spine. Uterus is surgically absent. Decompressed urinary bladder is essentially unremarkable. No pelvic lymphadenopathy or inflammatory changes. No destructive osseous lesions. EXTENSIVE FLUID-FILLED SMALL BOWEL WITH NUMEROUS AIR-FLUID LEVELS. DIFFERENTIAL CONSIDERATIONS DO INCLUDE OF BOWEL ILEUS/ENTERITIS. HOWEVER CAN'T EXCLUDE EARLY DISTAL SMALL BOWEL OBSTRUCTION      EKG: not done in the ED. Assessment and Plan   1. Nausea and vomiting: nonintractible. Possible ileus on CT scan, with chronic opioid use noted per history. Start Relistor x1 dose. Clear liquids advance diet as tolerated if patient has bowel movements. 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 She is not sure whether or not she has been truly diagnosed with Bon's disease at this time. We will supplement with oral prednisone 40 mg daily if not symptomatically resolved by tomorrow.     Patient Active Problem List   Diagnosis Code    Hypothyroidism E03.9    Fibromyalgia M79.7    Anxiety F41.9    [facility] E[facility]    Nausea and vomiting R11.2       Yaron White MD  Admitting Hospitalist    Emergency Contact:

## 2020-10-15 ENCOUNTER — HOSPITAL ENCOUNTER (OUTPATIENT)
Dept: ULTRASOUND IMAGING | Age: 65
Discharge: HOME | End: 2020-10-17
Payer: MEDICARE

## 2020-10-15 ENCOUNTER — HOSPITAL ENCOUNTER (OUTPATIENT)
Dept: WOMENS IMAGING | Age: 65
Discharge: HOME | End: 2020-10-17
Payer: MEDICARE

## 2020-10-15 PROCEDURE — G0279 TOMOSYNTHESIS, MAMMO: HCPCS

## 2020-10-15 PROCEDURE — 76642 ULTRASOUND BREAST LIMITED: CPT

## 2023-04-14 LAB
ALANINE AMINOTRANSFERASE (SGPT) (U/L) IN SER/PLAS: 51 U/L (ref 7–45)
ALBUMIN (G/DL) IN SER/PLAS: 4.6 G/DL (ref 3.4–5)
ALBUMIN (MG/L) IN URINE: 8.2 MG/L
ALBUMIN/CREATININE (UG/MG) IN URINE: 6.4 UG/MG CRT (ref 0–30)
ALKALINE PHOSPHATASE (U/L) IN SER/PLAS: 78 U/L (ref 33–136)
ANION GAP IN SER/PLAS: 14 MMOL/L (ref 10–20)
APPEARANCE, URINE: CLEAR
ASPARTATE AMINOTRANSFERASE (SGOT) (U/L) IN SER/PLAS: 36 U/L (ref 9–39)
BASOPHILS (10*3/UL) IN BLOOD BY AUTOMATED COUNT: 0.03 X10E9/L (ref 0–0.1)
BASOPHILS/100 LEUKOCYTES IN BLOOD BY AUTOMATED COUNT: 0.4 % (ref 0–2)
BILIRUBIN TOTAL (MG/DL) IN SER/PLAS: 0.4 MG/DL (ref 0–1.2)
BILIRUBIN, URINE: NEGATIVE
BLOOD, URINE: NEGATIVE
CALCIDIOL (25 OH VITAMIN D3) (NG/ML) IN SER/PLAS: 18 NG/ML
CALCIUM (MG/DL) IN SER/PLAS: 10.3 MG/DL (ref 8.6–10.3)
CARBON DIOXIDE, TOTAL (MMOL/L) IN SER/PLAS: 30 MMOL/L (ref 21–32)
CHLORIDE (MMOL/L) IN SER/PLAS: 98 MMOL/L (ref 98–107)
CHOLESTEROL (MG/DL) IN SER/PLAS: 314 MG/DL (ref 0–199)
CHOLESTEROL IN HDL (MG/DL) IN SER/PLAS: 34.6 MG/DL
CHOLESTEROL IN LDL (MG/DL) IN SER/PLAS BY DIRECT ASSAY: 224 MG/DL (ref 0–129)
CHOLESTEROL/HDL RATIO: 9.1
COLOR, URINE: YELLOW
CREATININE (MG/DL) IN SER/PLAS: 0.85 MG/DL (ref 0.5–1.05)
CREATININE (MG/DL) IN URINE: 128 MG/DL (ref 20–320)
EOSINOPHILS (10*3/UL) IN BLOOD BY AUTOMATED COUNT: 0.09 X10E9/L (ref 0–0.7)
EOSINOPHILS/100 LEUKOCYTES IN BLOOD BY AUTOMATED COUNT: 1.1 % (ref 0–6)
ERYTHROCYTE DISTRIBUTION WIDTH (RATIO) BY AUTOMATED COUNT: 13.6 % (ref 11.5–14.5)
ERYTHROCYTE MEAN CORPUSCULAR HEMOGLOBIN CONCENTRATION (G/DL) BY AUTOMATED: 32.5 G/DL (ref 32–36)
ERYTHROCYTE MEAN CORPUSCULAR VOLUME (FL) BY AUTOMATED COUNT: 97 FL (ref 80–100)
ERYTHROCYTES (10*6/UL) IN BLOOD BY AUTOMATED COUNT: 4.96 X10E12/L (ref 4–5.2)
FECAL OCCULT BLD IMMUNOASSAY: NORMAL
GFR FEMALE: 75 ML/MIN/1.73M2
GLUCOSE (MG/DL) IN SER/PLAS: 94 MG/DL (ref 74–99)
GLUCOSE, URINE: NEGATIVE MG/DL
HEMATOCRIT (%) IN BLOOD BY AUTOMATED COUNT: 48 % (ref 36–46)
HEMOGLOBIN (G/DL) IN BLOOD: 15.6 G/DL (ref 12–16)
IMMATURE GRANULOCYTES/100 LEUKOCYTES IN BLOOD BY AUTOMATED COUNT: 0.4 % (ref 0–0.9)
KETONES, URINE: NEGATIVE MG/DL
LDL: 209 MG/DL (ref 0–99)
LEUKOCYTE ESTERASE, URINE: NEGATIVE
LEUKOCYTES (10*3/UL) IN BLOOD BY AUTOMATED COUNT: 7.9 X10E9/L (ref 4.4–11.3)
LYMPHOCYTES (10*3/UL) IN BLOOD BY AUTOMATED COUNT: 2.9 X10E9/L (ref 1.2–4.8)
LYMPHOCYTES/100 LEUKOCYTES IN BLOOD BY AUTOMATED COUNT: 36.9 % (ref 13–44)
MONOCYTES (10*3/UL) IN BLOOD BY AUTOMATED COUNT: 0.6 X10E9/L (ref 0.1–1)
MONOCYTES/100 LEUKOCYTES IN BLOOD BY AUTOMATED COUNT: 7.6 % (ref 2–10)
NEUTROPHILS (10*3/UL) IN BLOOD BY AUTOMATED COUNT: 4.21 X10E9/L (ref 1.2–7.7)
NEUTROPHILS/100 LEUKOCYTES IN BLOOD BY AUTOMATED COUNT: 53.6 % (ref 40–80)
NITRITE, URINE: NEGATIVE
NON HDL CHOLESTEROL: 279 MG/DL
PH, URINE: 6 (ref 5–8)
PLATELETS (10*3/UL) IN BLOOD AUTOMATED COUNT: 313 X10E9/L (ref 150–450)
POTASSIUM (MMOL/L) IN SER/PLAS: 4.1 MMOL/L (ref 3.5–5.3)
PROTEIN TOTAL: 7.7 G/DL (ref 6.4–8.2)
PROTEIN, URINE: NEGATIVE MG/DL
SODIUM (MMOL/L) IN SER/PLAS: 138 MMOL/L (ref 136–145)
SPECIFIC GRAVITY, URINE: 1.02 (ref 1–1.03)
THYROTROPIN (MIU/L) IN SER/PLAS BY DETECTION LIMIT <= 0.05 MIU/L: 3.74 MIU/L (ref 0.44–3.98)
THYROXINE (T4) FREE (NG/DL) IN SER/PLAS: 1.34 NG/DL (ref 0.61–1.12)
TRIGLYCERIDE (MG/DL) IN SER/PLAS: 350 MG/DL (ref 0–149)
UREA NITROGEN (MG/DL) IN SER/PLAS: 11 MG/DL (ref 6–23)
UROBILINOGEN, URINE: <2 MG/DL (ref 0–1.9)
VLDL: 70 MG/DL (ref 0–40)

## 2023-07-10 LAB
ALANINE AMINOTRANSFERASE (SGPT) (U/L) IN SER/PLAS: 44 U/L (ref 7–45)
ALBUMIN (G/DL) IN SER/PLAS: 4.3 G/DL (ref 3.4–5)
ALKALINE PHOSPHATASE (U/L) IN SER/PLAS: 63 U/L (ref 33–136)
ANION GAP IN SER/PLAS: 12 MMOL/L (ref 10–20)
ASPARTATE AMINOTRANSFERASE (SGOT) (U/L) IN SER/PLAS: 27 U/L (ref 9–39)
BILIRUBIN TOTAL (MG/DL) IN SER/PLAS: 0.4 MG/DL (ref 0–1.2)
C REACTIVE PROTEIN (MG/L) IN SER/PLAS: 0.69 MG/DL
CALCIUM (MG/DL) IN SER/PLAS: 9.5 MG/DL (ref 8.6–10.3)
CARBON DIOXIDE, TOTAL (MMOL/L) IN SER/PLAS: 31 MMOL/L (ref 21–32)
CHLORIDE (MMOL/L) IN SER/PLAS: 99 MMOL/L (ref 98–107)
CREATININE (MG/DL) IN SER/PLAS: 0.76 MG/DL (ref 0.5–1.05)
ERYTHROCYTE DISTRIBUTION WIDTH (RATIO) BY AUTOMATED COUNT: 12.4 % (ref 11.5–14.5)
ERYTHROCYTE MEAN CORPUSCULAR HEMOGLOBIN CONCENTRATION (G/DL) BY AUTOMATED: 32.3 G/DL (ref 32–36)
ERYTHROCYTE MEAN CORPUSCULAR VOLUME (FL) BY AUTOMATED COUNT: 94 FL (ref 80–100)
ERYTHROCYTES (10*6/UL) IN BLOOD BY AUTOMATED COUNT: 4.95 X10E12/L (ref 4–5.2)
GFR FEMALE: 85 ML/MIN/1.73M2
GLUCOSE (MG/DL) IN SER/PLAS: 80 MG/DL (ref 74–99)
HEMATOCRIT (%) IN BLOOD BY AUTOMATED COUNT: 46.4 % (ref 36–46)
HEMOGLOBIN (G/DL) IN BLOOD: 15 G/DL (ref 12–16)
LEUKOCYTES (10*3/UL) IN BLOOD BY AUTOMATED COUNT: 13 X10E9/L (ref 4.4–11.3)
PLATELETS (10*3/UL) IN BLOOD AUTOMATED COUNT: 375 X10E9/L (ref 150–450)
POTASSIUM (MMOL/L) IN SER/PLAS: 3.3 MMOL/L (ref 3.5–5.3)
PROTEIN TOTAL: 7.4 G/DL (ref 6.4–8.2)
RHEUMATOID FACTOR (IU/ML) IN SERUM OR PLASMA: <10 IU/ML (ref 0–15)
SEDIMENTATION RATE, ERYTHROCYTE: 37 MM/H (ref 0–30)
SODIUM (MMOL/L) IN SER/PLAS: 139 MMOL/L (ref 136–145)
UREA NITROGEN (MG/DL) IN SER/PLAS: 12 MG/DL (ref 6–23)

## 2024-08-20 ENCOUNTER — HOSPITAL ENCOUNTER (OUTPATIENT)
Dept: RADIOLOGY | Facility: HOSPITAL | Age: 69
Discharge: HOME | End: 2024-08-20
Payer: MEDICARE

## 2024-08-20 DIAGNOSIS — F17.210 NICOTINE DEPENDENCE, CIGARETTES, UNCOMPLICATED: ICD-10-CM

## 2024-08-20 PROCEDURE — 71271 CT THORAX LUNG CANCER SCR C-: CPT

## 2024-08-21 VITALS — HEIGHT: 59 IN | BODY MASS INDEX: 37.91 KG/M2 | WEIGHT: 188.05 LBS

## 2024-10-04 ENCOUNTER — LAB (OUTPATIENT)
Dept: LAB | Facility: LAB | Age: 69
End: 2024-10-04
Payer: MEDICARE

## 2024-10-04 DIAGNOSIS — E03.9 HYPOTHYROIDISM, UNSPECIFIED: ICD-10-CM

## 2024-10-04 DIAGNOSIS — I10 ESSENTIAL (PRIMARY) HYPERTENSION: ICD-10-CM

## 2024-10-04 DIAGNOSIS — E78.00 PURE HYPERCHOLESTEROLEMIA, UNSPECIFIED: ICD-10-CM

## 2024-10-04 DIAGNOSIS — E55.9 VITAMIN D DEFICIENCY, UNSPECIFIED: Primary | ICD-10-CM

## 2024-10-04 DIAGNOSIS — K92.1 MELENA: ICD-10-CM

## 2024-10-04 LAB
ALBUMIN SERPL BCP-MCNC: 4.5 G/DL (ref 3.4–5)
ALP SERPL-CCNC: 82 U/L (ref 33–136)
ALT SERPL W P-5'-P-CCNC: 15 U/L (ref 7–45)
ANION GAP SERPL CALC-SCNC: 16 MMOL/L (ref 10–20)
APPEARANCE UR: CLEAR
AST SERPL W P-5'-P-CCNC: 14 U/L (ref 9–39)
BASOPHILS # BLD AUTO: 0.01 X10*3/UL (ref 0–0.1)
BASOPHILS NFR BLD AUTO: 0.1 %
BILIRUB SERPL-MCNC: 0.3 MG/DL (ref 0–1.2)
BILIRUB UR STRIP.AUTO-MCNC: NEGATIVE MG/DL
BUN SERPL-MCNC: 7 MG/DL (ref 6–23)
CALCIUM SERPL-MCNC: 9.9 MG/DL (ref 8.6–10.3)
CHLORIDE SERPL-SCNC: 96 MMOL/L (ref 98–107)
CHOLEST SERPL-MCNC: 262 MG/DL (ref 0–199)
CHOLESTEROL/HDL RATIO: 5
CO2 SERPL-SCNC: 31 MMOL/L (ref 21–32)
COLOR UR: ABNORMAL
CREAT SERPL-MCNC: 0.79 MG/DL (ref 0.5–1.05)
CREAT UR-MCNC: 103.6 MG/DL (ref 20–320)
EGFRCR SERPLBLD CKD-EPI 2021: 81 ML/MIN/1.73M*2
EOSINOPHIL # BLD AUTO: 0 X10*3/UL (ref 0–0.7)
EOSINOPHIL NFR BLD AUTO: 0 %
ERYTHROCYTE [DISTWIDTH] IN BLOOD BY AUTOMATED COUNT: 13.1 % (ref 11.5–14.5)
GLUCOSE SERPL-MCNC: 151 MG/DL (ref 74–99)
GLUCOSE UR STRIP.AUTO-MCNC: ABNORMAL MG/DL
HCT VFR BLD AUTO: 46.7 % (ref 36–46)
HDLC SERPL-MCNC: 52 MG/DL
HGB BLD-MCNC: 15.5 G/DL (ref 12–16)
IMM GRANULOCYTES # BLD AUTO: 0.09 X10*3/UL (ref 0–0.7)
IMM GRANULOCYTES NFR BLD AUTO: 0.8 % (ref 0–0.9)
KETONES UR STRIP.AUTO-MCNC: ABNORMAL MG/DL
LDLC SERPL CALC-MCNC: 151 MG/DL
LEUKOCYTE ESTERASE UR QL STRIP.AUTO: NEGATIVE
LYMPHOCYTES # BLD AUTO: 0.95 X10*3/UL (ref 1.2–4.8)
LYMPHOCYTES NFR BLD AUTO: 8.7 %
MCH RBC QN AUTO: 30.4 PG (ref 26–34)
MCHC RBC AUTO-ENTMCNC: 33.2 G/DL (ref 32–36)
MCV RBC AUTO: 92 FL (ref 80–100)
MICROALBUMIN UR-MCNC: 34.8 MG/L
MICROALBUMIN/CREAT UR: 33.6 UG/MG CREAT
MONOCYTES # BLD AUTO: 0.19 X10*3/UL (ref 0.1–1)
MONOCYTES NFR BLD AUTO: 1.7 %
NEUTROPHILS # BLD AUTO: 9.64 X10*3/UL (ref 1.2–7.7)
NEUTROPHILS NFR BLD AUTO: 88.7 %
NITRITE UR QL STRIP.AUTO: NEGATIVE
NON HDL CHOLESTEROL: 210 MG/DL (ref 0–149)
NRBC BLD-RTO: 0 /100 WBCS (ref 0–0)
PH UR STRIP.AUTO: 7.5 [PH]
PLATELET # BLD AUTO: 383 X10*3/UL (ref 150–450)
POTASSIUM SERPL-SCNC: 3.8 MMOL/L (ref 3.5–5.3)
PROT SERPL-MCNC: 6.8 G/DL (ref 6.4–8.2)
PROT UR STRIP.AUTO-MCNC: ABNORMAL MG/DL
RBC # BLD AUTO: 5.1 X10*6/UL (ref 4–5.2)
RBC # UR STRIP.AUTO: NEGATIVE /UL
RBC #/AREA URNS AUTO: NORMAL /HPF
SODIUM SERPL-SCNC: 139 MMOL/L (ref 136–145)
SP GR UR STRIP.AUTO: 1.02
SQUAMOUS #/AREA URNS AUTO: NORMAL /HPF
TRIGL SERPL-MCNC: 293 MG/DL (ref 0–149)
TSH SERPL-ACNC: 0.3 MIU/L (ref 0.44–3.98)
UROBILINOGEN UR STRIP.AUTO-MCNC: NORMAL MG/DL
VLDL: 59 MG/DL (ref 0–40)
WBC # BLD AUTO: 10.9 X10*3/UL (ref 4.4–11.3)
WBC #/AREA URNS AUTO: NORMAL /HPF

## 2024-10-04 PROCEDURE — 82570 ASSAY OF URINE CREATININE: CPT

## 2024-10-04 PROCEDURE — 84439 ASSAY OF FREE THYROXINE: CPT

## 2024-10-04 PROCEDURE — 80053 COMPREHEN METABOLIC PANEL: CPT

## 2024-10-04 PROCEDURE — 85025 COMPLETE CBC W/AUTO DIFF WBC: CPT

## 2024-10-04 PROCEDURE — 82306 VITAMIN D 25 HYDROXY: CPT

## 2024-10-04 PROCEDURE — 80061 LIPID PANEL: CPT

## 2024-10-04 PROCEDURE — 36415 COLL VENOUS BLD VENIPUNCTURE: CPT

## 2024-10-04 PROCEDURE — 84443 ASSAY THYROID STIM HORMONE: CPT

## 2024-10-04 PROCEDURE — 81001 URINALYSIS AUTO W/SCOPE: CPT

## 2024-10-04 PROCEDURE — 82043 UR ALBUMIN QUANTITATIVE: CPT

## 2024-10-05 LAB
25(OH)D3 SERPL-MCNC: 21 NG/ML (ref 30–100)
T4 FREE SERPL-MCNC: 1.23 NG/DL (ref 0.61–1.12)

## 2024-11-01 ENCOUNTER — HOSPITAL ENCOUNTER (OUTPATIENT)
Dept: RADIOLOGY | Facility: HOSPITAL | Age: 69
Discharge: HOME | End: 2024-11-01
Payer: MEDICARE

## 2024-11-01 DIAGNOSIS — R22.9 LOCALIZED SWELLING, MASS AND LUMP, UNSPECIFIED: ICD-10-CM

## 2024-11-01 DIAGNOSIS — M54.6 PAIN IN THORACIC SPINE: ICD-10-CM

## 2024-11-01 PROCEDURE — 72070 X-RAY EXAM THORAC SPINE 2VWS: CPT

## 2024-11-03 ENCOUNTER — HOSPITAL ENCOUNTER (EMERGENCY)
Facility: HOSPITAL | Age: 69
Discharge: HOME | End: 2024-11-03
Payer: MEDICARE

## 2024-11-03 VITALS
RESPIRATION RATE: 17 BRPM | TEMPERATURE: 98.1 F | DIASTOLIC BLOOD PRESSURE: 93 MMHG | BODY MASS INDEX: 37.38 KG/M2 | HEART RATE: 98 BPM | WEIGHT: 198 LBS | HEIGHT: 61 IN | SYSTOLIC BLOOD PRESSURE: 176 MMHG | OXYGEN SATURATION: 90 %

## 2024-11-03 DIAGNOSIS — R04.0 EPISTAXIS: Primary | ICD-10-CM

## 2024-11-03 PROCEDURE — 99282 EMERGENCY DEPT VISIT SF MDM: CPT

## 2024-11-03 ASSESSMENT — COLUMBIA-SUICIDE SEVERITY RATING SCALE - C-SSRS
6. HAVE YOU EVER DONE ANYTHING, STARTED TO DO ANYTHING, OR PREPARED TO DO ANYTHING TO END YOUR LIFE?: NO
1. IN THE PAST MONTH, HAVE YOU WISHED YOU WERE DEAD OR WISHED YOU COULD GO TO SLEEP AND NOT WAKE UP?: NO
2. HAVE YOU ACTUALLY HAD ANY THOUGHTS OF KILLING YOURSELF?: NO

## 2024-11-03 ASSESSMENT — PAIN - FUNCTIONAL ASSESSMENT: PAIN_FUNCTIONAL_ASSESSMENT: 0-10

## 2024-11-03 ASSESSMENT — PAIN DESCRIPTION - PAIN TYPE: TYPE: ACUTE PAIN

## 2024-11-03 ASSESSMENT — PAIN SCALES - GENERAL: PAINLEVEL_OUTOF10: 8

## 2024-11-03 ASSESSMENT — PAIN DESCRIPTION - DESCRIPTORS: DESCRIPTORS: ACHING

## 2024-11-03 ASSESSMENT — PAIN DESCRIPTION - LOCATION: LOCATION: BACK

## 2024-11-03 NOTE — ED PROVIDER NOTES
HPI   Chief Complaint   Patient presents with   • Epistaxis (Nose Bleed)     Pt states she wears oxygen at night and has been having nosebleeds. Hx of copd         History provided by:  Patient   used: No      69-year-old female who wears oxygen daily presents for intermittent daily nosebleeds.  She is not actively bleeding.    ROS negative unless otherwise stated in HPI        Patient History   Past Medical History:   Diagnosis Date   • Anesthesia of skin     Numbness   • Diverticulosis of intestine, part unspecified, without perforation or abscess without bleeding     Diverticulosis   • Other conditions influencing health status     Addisonian Crisis   • Other conditions influencing health status     Herniated Disc (L4 - L5) Central   • Pain in unspecified limb     Limb pain   • Paresthesia of skin     Tingling   • Personal history of other diseases of the circulatory system     History of hypertension   • Personal history of other diseases of the musculoskeletal system and connective tissue     History of low back pain   • Personal history of other endocrine, nutritional and metabolic disease     History of diabetes mellitus   • Personal history of other specified conditions     History of fatigue   • Personal history of other specified conditions     History of urinary frequency   • Unspecified visual loss     Visual impairment     Past Surgical History:   Procedure Laterality Date   • BACK SURGERY  2013    Lower Back Surgery   •  SECTION, CLASSIC  2013     Section   • HERNIA REPAIR  2013    Hernia Repair   • HERNIA REPAIR  2013    Hernia Repair   • HERNIA REPAIR  2013    Hernia Repair     No family history on file.  Social History     Tobacco Use   • Smoking status: Not on file   • Smokeless tobacco: Not on file   Substance Use Topics   • Alcohol use: Not on file   • Drug use: Not on file       Physical Exam   ED Triage Vitals [24 1643]    Temperature Heart Rate Respirations BP   36.7 °C (98.1 °F) 98 17 (!) 176/93      Pulse Ox Temp Source Heart Rate Source Patient Position   (!) 90 % Temporal Monitor --      BP Location FiO2 (%)     -- --       Physical Exam    General: alert, no distress, well nourished, talking in full and complete sentences  Skin: dry, intact, no rashes  Head: atraumatic  Eyes: EOMI, PERRLA, normal conjunctiva  Throat: no stridor, no hot potato voice  Nose: nares patent  Mouth: mucous membranes moist  Respiratory: non labored breathing  Extremities: FROM X4  Neuro: A&Ox3  Psych: cooperative, appropriate mood      ED Course & MDM   Diagnoses as of 11/03/24 1710   Epistaxis                 No data recorded     Mian Coma Scale Score: 15 (11/03/24 1645 : Renata Camarillo, ROBERT)                           Medical Decision Making  No active bleeding and no treatment required in the ER.  Patient is post be wearing her oxygen right now and likely why her oxygen is 90% on room air.  She states that is normally 92%.  She is instructed to use a humidifier nasal saline spray to keep her nasal mucosa moist and follow-up with the family doctor or ENT.  Afebrile, not tachycardic, not tachypneic, not hypoxic, tolerating PO, non toxic appearing and ambulating at baseline at discharge. Hemodynamically intact. Patient instructed on return precautions. All questions answered. Patient in agreement with treatment.      Procedure  Procedures     Melanie Araya PA-C  11/03/24 1710